# Patient Record
Sex: MALE | URBAN - METROPOLITAN AREA
[De-identification: names, ages, dates, MRNs, and addresses within clinical notes are randomized per-mention and may not be internally consistent; named-entity substitution may affect disease eponyms.]

---

## 2023-01-25 ENCOUNTER — TELEPHONE ENCOUNTER (OUTPATIENT)
Dept: URBAN - METROPOLITAN AREA CLINIC 50 | Facility: CLINIC | Age: 68
End: 2023-01-25

## 2023-01-25 ENCOUNTER — OFFICE VISIT (OUTPATIENT)
Dept: URBAN - METROPOLITAN AREA CLINIC 50 | Facility: CLINIC | Age: 68
End: 2023-01-25
Payer: COMMERCIAL

## 2023-01-25 ENCOUNTER — WEB ENCOUNTER (OUTPATIENT)
Dept: URBAN - METROPOLITAN AREA CLINIC 50 | Facility: CLINIC | Age: 68
End: 2023-01-25

## 2023-01-25 ENCOUNTER — LAB OUTSIDE AN ENCOUNTER (OUTPATIENT)
Dept: URBAN - METROPOLITAN AREA CLINIC 50 | Facility: CLINIC | Age: 68
End: 2023-01-25

## 2023-01-25 VITALS
HEART RATE: 57 BPM | BODY MASS INDEX: 32.47 KG/M2 | HEIGHT: 66 IN | TEMPERATURE: 97.8 F | DIASTOLIC BLOOD PRESSURE: 70 MMHG | SYSTOLIC BLOOD PRESSURE: 129 MMHG | WEIGHT: 202 LBS

## 2023-01-25 DIAGNOSIS — Z12.11 COLON CANCER SCREENING: ICD-10-CM

## 2023-01-25 DIAGNOSIS — K57.92 DIVERTICULITIS: ICD-10-CM

## 2023-01-25 DIAGNOSIS — Z86.010 PERSONAL HISTORY OF COLONIC POLYPS: ICD-10-CM

## 2023-01-25 DIAGNOSIS — K57.90 DIVERTICULOSIS: ICD-10-CM

## 2023-01-25 PROCEDURE — 99204 OFFICE O/P NEW MOD 45 MIN: CPT

## 2023-01-25 RX ORDER — CIPROFLOXACIN 500 MG/1
1 TABLET TABLET, FILM COATED ORAL
Qty: 14 TABLET | Refills: 0 | OUTPATIENT
Start: 2023-01-25 | End: 2023-02-01

## 2023-01-25 RX ORDER — METRONIDAZOLE 500 MG/1
1 TABLET TABLET ORAL TWICE A DAY
Qty: 14 TABLET | Refills: 0 | OUTPATIENT
Start: 2023-01-25 | End: 2023-02-01

## 2023-01-25 NOTE — HPI-TODAY'S VISIT:
68 yo M presents to the clinic for abdominal pain  10 years ago had diverticulitis Thanksgiving 2022 - abdominal pain right lower abdomen  Had CT scan without contrast 11/23/22: colonic diverticulosis with focal short segmental wall thickening involing the mid sigmoid colon with surrounding inflammatory change, appearances are most typical of acute diverticulitis, though it is difficult to definitievely exclude an underlying colonic neoplasm. mildly prominent pericolonic mesenteric lymph nodes are nospecific and could be reactive or metastatic in nature, small lliver lesions are most likely to reflectcysts, though not well characterized on this examination. In the case that colonic malignancuy is diagnosed, follow-up MRI abdommen without and with contrast sould be recommended for further assessment.  Was given abx - cipro and metronidaxole (did not take the full course of abx)  New Years - bad abdominal pain, low grade fever (99), decreased appetite, at that time finsihed the course of abx  Pain this time was above umbilicus  Finished on Sunday 1/22/23 (took about 6 days course - but took half a dose per day)  No low grade fever anymore - went away once finished abx  Abdominal pain improved with abx + abdominal discomfort  + decreased appetite  No bowel issues  No BRBPR, no melena  No unintentional weight loss  Has recently changed diet - stopped EtOH, eats plain foods (chicken, fish, salad)  Feels a lot better  last colonoscopy: ~10years ago, possibly had colon polyps (per patient report) No heaartburn  No dysphagia

## 2023-02-21 ENCOUNTER — OFFICE VISIT (OUTPATIENT)
Dept: URBAN - METROPOLITAN AREA CLINIC 50 | Facility: CLINIC | Age: 68
End: 2023-02-21
Payer: COMMERCIAL

## 2023-02-21 ENCOUNTER — WEB ENCOUNTER (OUTPATIENT)
Dept: URBAN - METROPOLITAN AREA CLINIC 50 | Facility: CLINIC | Age: 68
End: 2023-02-21

## 2023-02-21 VITALS
DIASTOLIC BLOOD PRESSURE: 65 MMHG | TEMPERATURE: 98 F | WEIGHT: 199 LBS | HEART RATE: 57 BPM | HEIGHT: 66 IN | BODY MASS INDEX: 31.98 KG/M2 | SYSTOLIC BLOOD PRESSURE: 110 MMHG

## 2023-02-21 DIAGNOSIS — K57.90 DIVERTICULOSIS: ICD-10-CM

## 2023-02-21 DIAGNOSIS — K92.1 BLOOD IN STOOL: ICD-10-CM

## 2023-02-21 PROBLEM — 428283002: Status: ACTIVE | Noted: 2023-01-25

## 2023-02-21 PROBLEM — 307496006: Status: ACTIVE | Noted: 2023-01-25

## 2023-02-21 PROCEDURE — 99213 OFFICE O/P EST LOW 20 MIN: CPT

## 2023-02-21 NOTE — HPI-TODAY'S VISIT:
68 yo M presents to the clinic for bright red blood in stool  Completed cipro anf flagyl - worked well Colon scheduled for 3/14/23 Past 2 weeks has noticed blood in stool  Bright red blood in stool and on toilet paper  Looks coagulated in the fecal matter  Can be intermittent, but can go 4 days staight  No blood this morning No melena  No abdoinnal pain  + general malasie  + abdominal cramping - not a lot  No pain with BM  NOrmal BMs No constipation  No diarrhea Last blood work November/October  ______________________ LAST VISIT  10 years ago had diverticulitis Thanksgiving 2022 - abdominal pain right lower abdomen  Had CT scan without contrast 11/23/22: colonic diverticulosis with focal short segmental wall thickening involing the mid sigmoid colon with surrounding inflammatory change, appearances are most typical of acute diverticulitis, though it is difficult to definitievely exclude an underlying colonic neoplasm. mildly prominent pericolonic mesenteric lymph nodes are nospecific and could be reactive or metastatic in nature, small lliver lesions are most likely to reflectcysts, though not well characterized on this examination. In the case that colonic malignancuy is diagnosed, follow-up MRI abdommen without and with contrast sould be recommended for further assessment.  Was given abx - cipro and metronidaxole (did not take the full course of abx)  New Years - bad abdominal pain, low grade fever (99), decreased appetite, at that time finsihed the course of abx  Pain this time was above umbilicus  Finished on Sunday 1/22/23 (took about 6 days course - but took half a dose per day)  No low grade fever anymore - went away once finished abx  Abdominal pain improved with abx + abdominal discomfort  + decreased appetite  No bowel issues  No BRBPR, no melena  No unintentional weight loss  Has recently changed diet - stopped EtOH, eats plain foods (chicken, fish, salad)  Feels a lot better  last colonoscopy: ~10years ago, possibly had colon polyps (per patient report) No heaartburn  No dysphagia

## 2023-02-23 PROBLEM — 397881000: Status: ACTIVE | Noted: 2023-01-25

## 2023-03-14 ENCOUNTER — OFFICE VISIT (OUTPATIENT)
Dept: URBAN - METROPOLITAN AREA SURGERY CENTER 18 | Facility: SURGERY CENTER | Age: 68
End: 2023-03-14
Payer: COMMERCIAL

## 2023-03-14 ENCOUNTER — CLAIMS CREATED FROM THE CLAIM WINDOW (OUTPATIENT)
Dept: URBAN - METROPOLITAN AREA CLINIC 4 | Facility: CLINIC | Age: 68
End: 2023-03-14
Payer: COMMERCIAL

## 2023-03-14 DIAGNOSIS — K52.89 (LYMPHOCYTIC) MICROSCOPIC COLITIS: ICD-10-CM

## 2023-03-14 DIAGNOSIS — K63.89 OTHER SPECIFIED DISEASES OF INTESTINE: ICD-10-CM

## 2023-03-14 DIAGNOSIS — K57.30 ACQUIRED DIVERTICULOSIS OF COLON: ICD-10-CM

## 2023-03-14 DIAGNOSIS — K63.89 APPENDICITIS EPIPLOICA: ICD-10-CM

## 2023-03-14 DIAGNOSIS — K62.1 ANAL AND RECTAL POLYP: ICD-10-CM

## 2023-03-14 PROCEDURE — 45380 COLONOSCOPY AND BIOPSY: CPT | Performed by: INTERNAL MEDICINE

## 2023-03-14 PROCEDURE — G8907 PT DOC NO EVENTS ON DISCHARG: HCPCS | Performed by: INTERNAL MEDICINE

## 2023-03-14 PROCEDURE — 88305 TISSUE EXAM BY PATHOLOGIST: CPT | Performed by: PATHOLOGY

## 2023-03-14 PROCEDURE — 45385 COLONOSCOPY W/LESION REMOVAL: CPT | Performed by: INTERNAL MEDICINE

## 2023-03-26 ENCOUNTER — WEB ENCOUNTER (OUTPATIENT)
Dept: URBAN - METROPOLITAN AREA CLINIC 35 | Facility: CLINIC | Age: 68
End: 2023-03-26

## 2023-03-26 RX ORDER — MESALAMINE 1.2 G/1
2 TABLETS WITH A MEAL TABLET, DELAYED RELEASE ORAL ONCE A DAY
Qty: 60 | Refills: 0 | OUTPATIENT
Start: 2023-03-26 | End: 2023-04-25

## 2023-04-13 ENCOUNTER — OFFICE VISIT (OUTPATIENT)
Dept: URBAN - METROPOLITAN AREA CLINIC 50 | Facility: CLINIC | Age: 68
End: 2023-04-13

## 2023-04-17 ENCOUNTER — OFFICE VISIT (OUTPATIENT)
Dept: URBAN - METROPOLITAN AREA CLINIC 50 | Facility: CLINIC | Age: 68
End: 2023-04-17

## 2023-05-14 ENCOUNTER — TELEPHONE ENCOUNTER (OUTPATIENT)
Dept: URBAN - METROPOLITAN AREA CLINIC 96 | Facility: CLINIC | Age: 68
End: 2023-05-14

## 2023-05-14 RX ORDER — METRONIDAZOLE 500 MG/1
1 TABLET TABLET, FILM COATED ORAL
Qty: 28 TABLET | Refills: 0 | OUTPATIENT
Start: 2023-05-14 | End: 2023-05-28

## 2023-05-14 RX ORDER — CIPROFLOXACIN HYDROCHLORIDE 250 MG/1
1 TABLET TABLET, FILM COATED ORAL
Qty: 28 TABLET | Refills: 1 | OUTPATIENT
Start: 2023-05-14 | End: 2023-06-11

## 2023-05-30 ENCOUNTER — OFFICE VISIT (OUTPATIENT)
Dept: URBAN - METROPOLITAN AREA CLINIC 50 | Facility: CLINIC | Age: 68
End: 2023-05-30

## 2023-06-08 ENCOUNTER — OFFICE VISIT (OUTPATIENT)
Dept: URBAN - METROPOLITAN AREA CLINIC 50 | Facility: CLINIC | Age: 68
End: 2023-06-08
Payer: COMMERCIAL

## 2023-06-08 ENCOUNTER — DASHBOARD ENCOUNTERS (OUTPATIENT)
Age: 68
End: 2023-06-08

## 2023-06-08 VITALS
SYSTOLIC BLOOD PRESSURE: 136 MMHG | HEIGHT: 66 IN | WEIGHT: 199 LBS | TEMPERATURE: 97 F | BODY MASS INDEX: 31.98 KG/M2 | DIASTOLIC BLOOD PRESSURE: 74 MMHG

## 2023-06-08 DIAGNOSIS — K57.32 SIGMOID DIVERTICULITIS: ICD-10-CM

## 2023-06-08 PROBLEM — 427910000: Status: ACTIVE | Noted: 2023-06-08

## 2023-06-08 PROCEDURE — 99213 OFFICE O/P EST LOW 20 MIN: CPT | Performed by: INTERNAL MEDICINE

## 2023-06-08 NOTE — HPI-TODAY'S VISIT:
68 y.o. WM Episode Thanksgiving - CAREY Bell 2.5 weeks ago - Mother's Day - bad episode - urgent care - Dr. Jacob covered - sent to ER - got CT scan s/p abx - feels well No blood in stool No N/V Bowels regular w/o issue No diarrhea

## 2023-12-14 NOTE — PHYSICAL EXAM CONSTITUTIONAL:
Pt advised.   well developed, well nourished, in no acute distress, ambulating without difficulty, normal communication ability